# Patient Record
Sex: FEMALE | Race: WHITE | Employment: FULL TIME | URBAN - METROPOLITAN AREA
[De-identification: names, ages, dates, MRNs, and addresses within clinical notes are randomized per-mention and may not be internally consistent; named-entity substitution may affect disease eponyms.]

---

## 2017-01-01 ENCOUNTER — TELEPHONE (OUTPATIENT)
Dept: HEMATOLOGY/ONCOLOGY | Facility: HOSPITAL | Age: 57
End: 2017-01-01

## 2017-06-16 NOTE — TELEPHONE ENCOUNTER
Patient states we rec'd a letter from insurance 4-25-17 denying insurance application. It is scanned in [employer]. Please call patient as she does not feel this should have been denied. She does not know what letter said or why they denied it.  [phone]

## 2017-08-14 NOTE — TELEPHONE ENCOUNTER
Patient called about her message in June re: application for [employer] being denied.   Patient states the letter was sent to Dr. Mirlande Muniz (in media tab of her chart) and patient does not know what the letter said or exactly why she was denied and they won'

## 2017-08-14 NOTE — TELEPHONE ENCOUNTER
Returned phone call. Pt notified that per [employer] letter on 4/25/17 the decision for denial was based on the findings noted on a Brain MRI completed on 3/3/12 in conjunction with breast cancer history.  Informed pt to contact [employer] and ask what they need for appr

## 2017-08-14 NOTE — TELEPHONE ENCOUNTER
I spoke with Randy Huffman and explained that she can have the insurance company request additional records. She will call her insurance company and have them request additional records. I explained that these should have all of the information that she needs.

## 2017-11-08 ENCOUNTER — TELEPHONE (OUTPATIENT)
Dept: HEMATOLOGY/ONCOLOGY | Facility: HOSPITAL | Age: 57
End: 2017-11-08

## 2017-12-06 ENCOUNTER — APPOINTMENT (OUTPATIENT)
Dept: HEMATOLOGY/ONCOLOGY | Facility: HOSPITAL | Age: 57
End: 2017-12-06
Attending: INTERNAL MEDICINE
Payer: COMMERCIAL